# Patient Record
Sex: MALE | Race: BLACK OR AFRICAN AMERICAN | Employment: UNEMPLOYED | URBAN - METROPOLITAN AREA
[De-identification: names, ages, dates, MRNs, and addresses within clinical notes are randomized per-mention and may not be internally consistent; named-entity substitution may affect disease eponyms.]

---

## 2021-07-24 ENCOUNTER — HOSPITAL ENCOUNTER (OUTPATIENT)
Age: 36
Setting detail: OBSERVATION
Discharge: HOME | End: 2021-07-27
Attending: STUDENT IN AN ORGANIZED HEALTH CARE EDUCATION/TRAINING PROGRAM | Admitting: FAMILY MEDICINE
Payer: MEDICAID

## 2021-07-24 ENCOUNTER — ANESTHESIA EVENT (OUTPATIENT)
Dept: SURGERY | Age: 36
End: 2021-07-24
Payer: MEDICAID

## 2021-07-24 ENCOUNTER — APPOINTMENT (OUTPATIENT)
Dept: CT IMAGING | Age: 36
End: 2021-07-24
Attending: STUDENT IN AN ORGANIZED HEALTH CARE EDUCATION/TRAINING PROGRAM
Payer: MEDICAID

## 2021-07-24 ENCOUNTER — APPOINTMENT (OUTPATIENT)
Dept: GENERAL RADIOLOGY | Age: 36
End: 2021-07-24
Attending: EMERGENCY MEDICINE
Payer: MEDICAID

## 2021-07-24 ENCOUNTER — ANESTHESIA (OUTPATIENT)
Dept: SURGERY | Age: 36
End: 2021-07-24
Payer: MEDICAID

## 2021-07-24 ENCOUNTER — HOSPITAL ENCOUNTER (EMERGENCY)
Age: 36
Discharge: OTHER INSTITUTION | End: 2021-07-24
Attending: EMERGENCY MEDICINE
Payer: MEDICAID

## 2021-07-24 ENCOUNTER — APPOINTMENT (OUTPATIENT)
Dept: GENERAL RADIOLOGY | Age: 36
End: 2021-07-24
Attending: SURGERY
Payer: MEDICAID

## 2021-07-24 VITALS
BODY MASS INDEX: 35.99 KG/M2 | TEMPERATURE: 98.1 F | HEIGHT: 69 IN | SYSTOLIC BLOOD PRESSURE: 147 MMHG | HEART RATE: 86 BPM | WEIGHT: 243 LBS | DIASTOLIC BLOOD PRESSURE: 76 MMHG | RESPIRATION RATE: 18 BRPM | OXYGEN SATURATION: 97 %

## 2021-07-24 DIAGNOSIS — S63.004A: Primary | ICD-10-CM

## 2021-07-24 DIAGNOSIS — S62.001A CLOSED VOLAR TRANSSCAPHOID-LUNATE FRACTURE DISLOCATION OF RIGHT WRIST, INITIAL ENCOUNTER: Primary | ICD-10-CM

## 2021-07-24 DIAGNOSIS — T14.8XXA ABRASION: ICD-10-CM

## 2021-07-24 DIAGNOSIS — S62.109D CLOSED FRACTURE OF WRIST WITH ROUTINE HEALING, UNSPECIFIED LATERALITY, SUBSEQUENT ENCOUNTER: ICD-10-CM

## 2021-07-24 DIAGNOSIS — S62.121A CLOSED VOLAR TRANSSCAPHOID-LUNATE FRACTURE DISLOCATION OF RIGHT WRIST, INITIAL ENCOUNTER: Primary | ICD-10-CM

## 2021-07-24 PROBLEM — S62.109A FX WRIST: Status: ACTIVE | Noted: 2021-07-24

## 2021-07-24 PROCEDURE — 74011250636 HC RX REV CODE- 250/636: Performed by: ANESTHESIOLOGY

## 2021-07-24 PROCEDURE — 73100 X-RAY EXAM OF WRIST: CPT

## 2021-07-24 PROCEDURE — 74011250636 HC RX REV CODE- 250/636: Performed by: STUDENT IN AN ORGANIZED HEALTH CARE EDUCATION/TRAINING PROGRAM

## 2021-07-24 PROCEDURE — 77030026438 HC STYL ET INTUB CARD -A: Performed by: ANESTHESIOLOGY

## 2021-07-24 PROCEDURE — 77030013079 HC BLNKT BAIR HGGR 3M -A: Performed by: ANESTHESIOLOGY

## 2021-07-24 PROCEDURE — 76210000006 HC OR PH I REC 0.5 TO 1 HR: Performed by: SURGERY

## 2021-07-24 PROCEDURE — 76060000033 HC ANESTHESIA 1 TO 1.5 HR: Performed by: SURGERY

## 2021-07-24 PROCEDURE — 99285 EMERGENCY DEPT VISIT HI MDM: CPT

## 2021-07-24 PROCEDURE — 74011000258 HC RX REV CODE- 258: Performed by: NURSE ANESTHETIST, CERTIFIED REGISTERED

## 2021-07-24 PROCEDURE — 74011250637 HC RX REV CODE- 250/637: Performed by: ANESTHESIOLOGY

## 2021-07-24 PROCEDURE — 99218 HC RM OBSERVATION: CPT

## 2021-07-24 PROCEDURE — 76010000149 HC OR TIME 1 TO 1.5 HR: Performed by: SURGERY

## 2021-07-24 PROCEDURE — 77030020754 HC CUF TRNQT 2BLA STRY -B: Performed by: SURGERY

## 2021-07-24 PROCEDURE — 73130 X-RAY EXAM OF HAND: CPT

## 2021-07-24 PROCEDURE — 73200 CT UPPER EXTREMITY W/O DYE: CPT

## 2021-07-24 PROCEDURE — 96374 THER/PROPH/DIAG INJ IV PUSH: CPT

## 2021-07-24 PROCEDURE — 77030002933 HC SUT MCRYL J&J -A: Performed by: SURGERY

## 2021-07-24 PROCEDURE — 74011250636 HC RX REV CODE- 250/636: Performed by: NURSE ANESTHETIST, CERTIFIED REGISTERED

## 2021-07-24 PROCEDURE — 74011250637 HC RX REV CODE- 250/637: Performed by: EMERGENCY MEDICINE

## 2021-07-24 PROCEDURE — 99283 EMERGENCY DEPT VISIT LOW MDM: CPT

## 2021-07-24 PROCEDURE — 77030010396 HC WRE FIX C CNMD -A: Performed by: SURGERY

## 2021-07-24 PROCEDURE — 2709999900 HC NON-CHARGEABLE SUPPLY: Performed by: SURGERY

## 2021-07-24 PROCEDURE — 73110 X-RAY EXAM OF WRIST: CPT

## 2021-07-24 PROCEDURE — 74011000250 HC RX REV CODE- 250: Performed by: SURGERY

## 2021-07-24 PROCEDURE — 99222 1ST HOSP IP/OBS MODERATE 55: CPT | Performed by: SURGERY

## 2021-07-24 PROCEDURE — 74011000250 HC RX REV CODE- 250: Performed by: EMERGENCY MEDICINE

## 2021-07-24 PROCEDURE — 74011000250 HC RX REV CODE- 250: Performed by: NURSE ANESTHETIST, CERTIFIED REGISTERED

## 2021-07-24 PROCEDURE — 77030009023 HC CAP PROTCT PIN MCRA -A: Performed by: SURGERY

## 2021-07-24 PROCEDURE — 77030008684 HC TU ET CUF COVD -B: Performed by: ANESTHESIOLOGY

## 2021-07-24 PROCEDURE — G0378 HOSPITAL OBSERVATION PER HR: HCPCS

## 2021-07-24 DEVICE — C-WIRE PAK DOUBLE ENDED ORTHOPAEDIC WIRE, TROCAR, .045" (1.14 MM)
Type: IMPLANTABLE DEVICE | Status: FUNCTIONAL
Brand: C-WIRE

## 2021-07-24 RX ORDER — FENTANYL CITRATE 50 UG/ML
25 INJECTION, SOLUTION INTRAMUSCULAR; INTRAVENOUS
Status: DISCONTINUED | OUTPATIENT
Start: 2021-07-24 | End: 2021-07-25

## 2021-07-24 RX ORDER — SODIUM CHLORIDE 0.9 % (FLUSH) 0.9 %
5-40 SYRINGE (ML) INJECTION EVERY 8 HOURS
Status: DISCONTINUED | OUTPATIENT
Start: 2021-07-24 | End: 2021-07-27 | Stop reason: HOSPADM

## 2021-07-24 RX ORDER — ACETAMINOPHEN 500 MG
1000 TABLET ORAL ONCE
Status: COMPLETED | OUTPATIENT
Start: 2021-07-24 | End: 2021-07-24

## 2021-07-24 RX ORDER — SODIUM CHLORIDE 0.9 % (FLUSH) 0.9 %
5-40 SYRINGE (ML) INJECTION EVERY 8 HOURS
Status: DISCONTINUED | OUTPATIENT
Start: 2021-07-24 | End: 2021-07-24 | Stop reason: HOSPADM

## 2021-07-24 RX ORDER — SODIUM CHLORIDE 9 MG/ML
25 INJECTION, SOLUTION INTRAVENOUS CONTINUOUS
Status: DISCONTINUED | OUTPATIENT
Start: 2021-07-24 | End: 2021-07-24 | Stop reason: HOSPADM

## 2021-07-24 RX ORDER — OXYCODONE HYDROCHLORIDE 5 MG/1
5 TABLET ORAL
Status: COMPLETED | OUTPATIENT
Start: 2021-07-24 | End: 2021-07-24

## 2021-07-24 RX ORDER — SUCCINYLCHOLINE CHLORIDE 20 MG/ML
INJECTION INTRAMUSCULAR; INTRAVENOUS AS NEEDED
Status: DISCONTINUED | OUTPATIENT
Start: 2021-07-24 | End: 2021-07-24 | Stop reason: HOSPADM

## 2021-07-24 RX ORDER — ROCURONIUM BROMIDE 10 MG/ML
INJECTION, SOLUTION INTRAVENOUS AS NEEDED
Status: DISCONTINUED | OUTPATIENT
Start: 2021-07-24 | End: 2021-07-24 | Stop reason: HOSPADM

## 2021-07-24 RX ORDER — SODIUM CHLORIDE, SODIUM LACTATE, POTASSIUM CHLORIDE, CALCIUM CHLORIDE 600; 310; 30; 20 MG/100ML; MG/100ML; MG/100ML; MG/100ML
125 INJECTION, SOLUTION INTRAVENOUS CONTINUOUS
Status: DISCONTINUED | OUTPATIENT
Start: 2021-07-24 | End: 2021-07-24 | Stop reason: HOSPADM

## 2021-07-24 RX ORDER — ROPIVACAINE HYDROCHLORIDE 5 MG/ML
30 INJECTION, SOLUTION EPIDURAL; INFILTRATION; PERINEURAL ONCE
Status: DISCONTINUED | OUTPATIENT
Start: 2021-07-24 | End: 2021-07-24 | Stop reason: HOSPADM

## 2021-07-24 RX ORDER — MIDAZOLAM HYDROCHLORIDE 1 MG/ML
INJECTION, SOLUTION INTRAMUSCULAR; INTRAVENOUS AS NEEDED
Status: DISCONTINUED | OUTPATIENT
Start: 2021-07-24 | End: 2021-07-24 | Stop reason: HOSPADM

## 2021-07-24 RX ORDER — SODIUM CHLORIDE 0.9 % (FLUSH) 0.9 %
5-40 SYRINGE (ML) INJECTION AS NEEDED
Status: DISCONTINUED | OUTPATIENT
Start: 2021-07-24 | End: 2021-07-24 | Stop reason: HOSPADM

## 2021-07-24 RX ORDER — IBUPROFEN 400 MG/1
800 TABLET ORAL
Status: COMPLETED | OUTPATIENT
Start: 2021-07-24 | End: 2021-07-24

## 2021-07-24 RX ORDER — SODIUM CHLORIDE 9 MG/ML
25 INJECTION, SOLUTION INTRAVENOUS CONTINUOUS
Status: DISCONTINUED | OUTPATIENT
Start: 2021-07-24 | End: 2021-07-27 | Stop reason: HOSPADM

## 2021-07-24 RX ORDER — PHENYLEPHRINE HCL IN 0.9% NACL 0.4MG/10ML
SYRINGE (ML) INTRAVENOUS AS NEEDED
Status: DISCONTINUED | OUTPATIENT
Start: 2021-07-24 | End: 2021-07-24 | Stop reason: HOSPADM

## 2021-07-24 RX ORDER — MIDAZOLAM HYDROCHLORIDE 1 MG/ML
1 INJECTION, SOLUTION INTRAMUSCULAR; INTRAVENOUS AS NEEDED
Status: DISCONTINUED | OUTPATIENT
Start: 2021-07-24 | End: 2021-07-24 | Stop reason: HOSPADM

## 2021-07-24 RX ORDER — HYDROMORPHONE HYDROCHLORIDE 1 MG/ML
0.2 INJECTION, SOLUTION INTRAMUSCULAR; INTRAVENOUS; SUBCUTANEOUS
Status: DISCONTINUED | OUTPATIENT
Start: 2021-07-24 | End: 2021-07-25

## 2021-07-24 RX ORDER — PROPOFOL 10 MG/ML
INJECTION, EMULSION INTRAVENOUS AS NEEDED
Status: DISCONTINUED | OUTPATIENT
Start: 2021-07-24 | End: 2021-07-24 | Stop reason: HOSPADM

## 2021-07-24 RX ORDER — DEXAMETHASONE SODIUM PHOSPHATE 4 MG/ML
INJECTION, SOLUTION INTRA-ARTICULAR; INTRALESIONAL; INTRAMUSCULAR; INTRAVENOUS; SOFT TISSUE AS NEEDED
Status: DISCONTINUED | OUTPATIENT
Start: 2021-07-24 | End: 2021-07-24 | Stop reason: HOSPADM

## 2021-07-24 RX ORDER — SODIUM CHLORIDE, SODIUM LACTATE, POTASSIUM CHLORIDE, CALCIUM CHLORIDE 600; 310; 30; 20 MG/100ML; MG/100ML; MG/100ML; MG/100ML
75 INJECTION, SOLUTION INTRAVENOUS CONTINUOUS
Status: DISCONTINUED | OUTPATIENT
Start: 2021-07-24 | End: 2021-07-27 | Stop reason: HOSPADM

## 2021-07-24 RX ORDER — SODIUM CHLORIDE 0.9 % (FLUSH) 0.9 %
5-40 SYRINGE (ML) INJECTION AS NEEDED
Status: DISCONTINUED | OUTPATIENT
Start: 2021-07-24 | End: 2021-07-27 | Stop reason: HOSPADM

## 2021-07-24 RX ORDER — ONDANSETRON 2 MG/ML
INJECTION INTRAMUSCULAR; INTRAVENOUS AS NEEDED
Status: DISCONTINUED | OUTPATIENT
Start: 2021-07-24 | End: 2021-07-24 | Stop reason: HOSPADM

## 2021-07-24 RX ORDER — LIDOCAINE HYDROCHLORIDE 20 MG/ML
INJECTION, SOLUTION EPIDURAL; INFILTRATION; INTRACAUDAL; PERINEURAL AS NEEDED
Status: DISCONTINUED | OUTPATIENT
Start: 2021-07-24 | End: 2021-07-24 | Stop reason: HOSPADM

## 2021-07-24 RX ORDER — LIDOCAINE HYDROCHLORIDE 10 MG/ML
10 INJECTION, SOLUTION EPIDURAL; INFILTRATION; INTRACAUDAL; PERINEURAL ONCE
Status: COMPLETED | OUTPATIENT
Start: 2021-07-24 | End: 2021-07-24

## 2021-07-24 RX ORDER — FENTANYL CITRATE 50 UG/ML
50 INJECTION, SOLUTION INTRAMUSCULAR; INTRAVENOUS AS NEEDED
Status: DISCONTINUED | OUTPATIENT
Start: 2021-07-24 | End: 2021-07-24 | Stop reason: HOSPADM

## 2021-07-24 RX ORDER — LIDOCAINE HYDROCHLORIDE 10 MG/ML
0.1 INJECTION, SOLUTION EPIDURAL; INFILTRATION; INTRACAUDAL; PERINEURAL AS NEEDED
Status: DISCONTINUED | OUTPATIENT
Start: 2021-07-24 | End: 2021-07-24 | Stop reason: HOSPADM

## 2021-07-24 RX ORDER — DIPHENHYDRAMINE HYDROCHLORIDE 50 MG/ML
12.5 INJECTION, SOLUTION INTRAMUSCULAR; INTRAVENOUS AS NEEDED
Status: ACTIVE | OUTPATIENT
Start: 2021-07-24 | End: 2021-07-24

## 2021-07-24 RX ORDER — ACETAMINOPHEN 500 MG
TABLET ORAL
Status: DISPENSED
Start: 2021-07-24 | End: 2021-07-25

## 2021-07-24 RX ORDER — KETAMINE HYDROCHLORIDE 10 MG/ML
INJECTION, SOLUTION INTRAMUSCULAR; INTRAVENOUS AS NEEDED
Status: DISCONTINUED | OUTPATIENT
Start: 2021-07-24 | End: 2021-07-24 | Stop reason: HOSPADM

## 2021-07-24 RX ORDER — MORPHINE SULFATE 2 MG/ML
2 INJECTION, SOLUTION INTRAMUSCULAR; INTRAVENOUS
Status: DISCONTINUED | OUTPATIENT
Start: 2021-07-24 | End: 2021-07-25

## 2021-07-24 RX ORDER — MIDAZOLAM HYDROCHLORIDE 1 MG/ML
0.5 INJECTION, SOLUTION INTRAMUSCULAR; INTRAVENOUS
Status: DISCONTINUED | OUTPATIENT
Start: 2021-07-24 | End: 2021-07-25

## 2021-07-24 RX ORDER — MORPHINE SULFATE 4 MG/ML
4 INJECTION INTRAVENOUS
Status: COMPLETED | OUTPATIENT
Start: 2021-07-24 | End: 2021-07-24

## 2021-07-24 RX ORDER — BUPIVACAINE HYDROCHLORIDE 5 MG/ML
INJECTION, SOLUTION EPIDURAL; INTRACAUDAL AS NEEDED
Status: DISCONTINUED | OUTPATIENT
Start: 2021-07-24 | End: 2021-07-24 | Stop reason: HOSPADM

## 2021-07-24 RX ORDER — HYDROMORPHONE HYDROCHLORIDE 2 MG/ML
INJECTION, SOLUTION INTRAMUSCULAR; INTRAVENOUS; SUBCUTANEOUS AS NEEDED
Status: DISCONTINUED | OUTPATIENT
Start: 2021-07-24 | End: 2021-07-24 | Stop reason: HOSPADM

## 2021-07-24 RX ORDER — DEXMEDETOMIDINE HYDROCHLORIDE 100 UG/ML
INJECTION, SOLUTION INTRAVENOUS AS NEEDED
Status: DISCONTINUED | OUTPATIENT
Start: 2021-07-24 | End: 2021-07-24 | Stop reason: HOSPADM

## 2021-07-24 RX ORDER — FENTANYL CITRATE 50 UG/ML
INJECTION, SOLUTION INTRAMUSCULAR; INTRAVENOUS AS NEEDED
Status: DISCONTINUED | OUTPATIENT
Start: 2021-07-24 | End: 2021-07-24 | Stop reason: HOSPADM

## 2021-07-24 RX ORDER — ONDANSETRON 2 MG/ML
4 INJECTION INTRAMUSCULAR; INTRAVENOUS AS NEEDED
Status: DISCONTINUED | OUTPATIENT
Start: 2021-07-24 | End: 2021-07-27 | Stop reason: HOSPADM

## 2021-07-24 RX ORDER — ACETAMINOPHEN 325 MG/1
650 TABLET ORAL ONCE
Status: DISCONTINUED | OUTPATIENT
Start: 2021-07-24 | End: 2021-07-24

## 2021-07-24 RX ADMIN — HYDROMORPHONE HYDROCHLORIDE 0.5 MG: 2 INJECTION, SOLUTION INTRAMUSCULAR; INTRAVENOUS; SUBCUTANEOUS at 20:56

## 2021-07-24 RX ADMIN — LIDOCAINE HYDROCHLORIDE 100 MG: 20 INJECTION, SOLUTION EPIDURAL; INFILTRATION; INTRACAUDAL; PERINEURAL at 20:30

## 2021-07-24 RX ADMIN — Medication 10 ML: at 19:40

## 2021-07-24 RX ADMIN — MORPHINE SULFATE 4 MG: 4 INJECTION, SOLUTION INTRAMUSCULAR; INTRAVENOUS at 12:07

## 2021-07-24 RX ADMIN — SODIUM CHLORIDE, POTASSIUM CHLORIDE, SODIUM LACTATE AND CALCIUM CHLORIDE 75 ML/HR: 600; 310; 30; 20 INJECTION, SOLUTION INTRAVENOUS at 22:13

## 2021-07-24 RX ADMIN — IBUPROFEN 800 MG: 400 TABLET ORAL at 06:36

## 2021-07-24 RX ADMIN — KETAMINE HYDROCHLORIDE 50 MG: 10 INJECTION, SOLUTION INTRAMUSCULAR; INTRAVENOUS at 20:30

## 2021-07-24 RX ADMIN — DEXMEDETOMIDINE HYDROCHLORIDE 10 MCG: 100 INJECTION, SOLUTION, CONCENTRATE INTRAVENOUS at 20:59

## 2021-07-24 RX ADMIN — DEXMEDETOMIDINE HYDROCHLORIDE 10 MCG: 100 INJECTION, SOLUTION, CONCENTRATE INTRAVENOUS at 21:25

## 2021-07-24 RX ADMIN — OXYCODONE HYDROCHLORIDE 5 MG: 5 TABLET ORAL at 08:29

## 2021-07-24 RX ADMIN — CEFAZOLIN SODIUM 3 G: 10 INJECTION, POWDER, FOR SOLUTION INTRAVENOUS at 20:51

## 2021-07-24 RX ADMIN — ROCURONIUM BROMIDE 10 MG: 10 SOLUTION INTRAVENOUS at 20:30

## 2021-07-24 RX ADMIN — ROCURONIUM BROMIDE 40 MG: 10 SOLUTION INTRAVENOUS at 20:35

## 2021-07-24 RX ADMIN — ONDANSETRON HYDROCHLORIDE 4 MG: 2 INJECTION, SOLUTION INTRAMUSCULAR; INTRAVENOUS at 21:25

## 2021-07-24 RX ADMIN — MIDAZOLAM 2 MG: 1 INJECTION INTRAMUSCULAR; INTRAVENOUS at 20:26

## 2021-07-24 RX ADMIN — Medication 10 ML: at 22:08

## 2021-07-24 RX ADMIN — DEXAMETHASONE SODIUM PHOSPHATE 8 MG: 4 INJECTION, SOLUTION INTRAMUSCULAR; INTRAVENOUS at 20:39

## 2021-07-24 RX ADMIN — DEXMEDETOMIDINE HYDROCHLORIDE 10 MCG: 100 INJECTION, SOLUTION, CONCENTRATE INTRAVENOUS at 21:14

## 2021-07-24 RX ADMIN — PROPOFOL 200 MG: 10 INJECTION, EMULSION INTRAVENOUS at 20:30

## 2021-07-24 RX ADMIN — Medication 120 MCG: at 21:25

## 2021-07-24 RX ADMIN — SODIUM CHLORIDE, POTASSIUM CHLORIDE, SODIUM LACTATE AND CALCIUM CHLORIDE 125 ML/HR: 600; 310; 30; 20 INJECTION, SOLUTION INTRAVENOUS at 19:40

## 2021-07-24 RX ADMIN — SUCCINYLCHOLINE CHLORIDE 160 MG: 20 INJECTION, SOLUTION INTRAMUSCULAR; INTRAVENOUS at 20:30

## 2021-07-24 RX ADMIN — FENTANYL CITRATE 100 MCG: 50 INJECTION, SOLUTION INTRAMUSCULAR; INTRAVENOUS at 20:30

## 2021-07-24 RX ADMIN — LIDOCAINE HYDROCHLORIDE 10 ML: 10 INJECTION, SOLUTION EPIDURAL; INFILTRATION; INTRACAUDAL; PERINEURAL at 09:03

## 2021-07-24 RX ADMIN — SUGAMMADEX 400 MG: 100 INJECTION, SOLUTION INTRAVENOUS at 21:29

## 2021-07-24 RX ADMIN — ACETAMINOPHEN 1000 MG: 500 TABLET ORAL at 19:44
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
## 2021-07-24 NOTE — ED PROVIDER NOTES
76-year-old gentleman transferred for surgical stabilization of a right carpal bone dislocation. He had a fall last evening onto an outstretched right wrist.  Only complaint is right wrist pain, no loss of consciousness, headache. Complaining of pain, loss of motion of the right wrist and tenderness. Pain is aching and moderate, constant. The history is provided by the patient. Hand Pain   This is a new problem. The current episode started 3 to 5 hours ago. The problem occurs constantly. The pain is present in the right wrist. The pain is moderate. Associated symptoms include limited range of motion. History reviewed. No pertinent past medical history. History reviewed. No pertinent surgical history. History reviewed. No pertinent family history. Social History     Socioeconomic History    Marital status: SINGLE     Spouse name: Not on file    Number of children: Not on file    Years of education: Not on file    Highest education level: Not on file   Occupational History    Not on file   Tobacco Use    Smoking status: Current Every Day Smoker   Substance and Sexual Activity    Alcohol use: Not on file    Drug use: Yes     Types: Cocaine    Sexual activity: Not on file   Other Topics Concern    Not on file   Social History Narrative    Not on file     Social Determinants of Health     Financial Resource Strain:     Difficulty of Paying Living Expenses:    Food Insecurity:     Worried About Running Out of Food in the Last Year:     [address] in the Last Year:    Transportation Needs:     Lack of Transportation (Medical):      Lack of Transportation (Non-Medical):    Physical Activity:     Days of Exercise per Week:     Minutes of Exercise per Session:    Stress:     Feeling of Stress :    Social Connections:     Frequency of Communication with Friends and Family:     Frequency of Social Gatherings with Friends and Family:     Attends Sabianist Services:     Active Member of Clubs or Organizations:     Attends Club or Organization Meetings:     Marital Status:    Intimate Partner Violence:     Fear of Current or Ex-Partner:     Emotionally Abused:     Physically Abused:     Sexually Abused: ALLERGIES: Patient has no known allergies. Review of Systems   Constitutional: Negative. Respiratory: Negative. Cardiovascular: Negative. Gastrointestinal: Negative. Musculoskeletal: Positive for arthralgias. All other systems reviewed and are negative. Vitals:    07/26/21 1403 07/26/21 2038 07/27/21 0138 07/27/21 0800   BP: (!) 154/91 (!) 159/62 133/72    Pulse: 81 (!) 59 64 65   Resp: 16 14 18    Temp: 98.5 °F (36.9 °C) 98.5 °F (36.9 °C) 98.4 °F (36.9 °C)    SpO2: 98% 98% 97%    Weight:       Height:                Physical Exam  Constitutional:       General: He is not in acute distress. Appearance: He is not toxic-appearing. HENT:      Head: Normocephalic and atraumatic. Mouth/Throat:      Mouth: Mucous membranes are moist.   Eyes:      Extraocular Movements: Extraocular movements intact. Cardiovascular:      Rate and Rhythm: Normal rate and regular rhythm. Heart sounds: Normal heart sounds. Pulmonary:      Effort: Pulmonary effort is normal. No respiratory distress. Breath sounds: Normal breath sounds. Abdominal:      Palpations: Abdomen is soft. Tenderness: There is no abdominal tenderness. Musculoskeletal:        Arms:       Cervical back: Normal range of motion. Right lower leg: No edema. Left lower leg: No edema. Skin:     Capillary Refill: Capillary refill takes less than 2 seconds. Neurological:      General: No focal deficit present. Mental Status: He is alert and oriented to person, place, and time.    Psychiatric:         Mood and Affect: Mood normal.          MDM          Procedures      PROGRESS NOTE:  1:26 PM    Patient transferred for operative management of carpal bone dislocation, discussed with attending surgeon who requested a CT performed prior to the operation, he is booked for the OR and will likely be discharged from the operating room to follow-up with Dr. Claire Springer:  Labs Reviewed   METABOLIC PANEL, COMPREHENSIVE - Abnormal; Notable for the following components:       Result Value    Anion gap 4 (*)     Creatinine 1.62 (*)     BUN/Creatinine ratio 11 (*)     GFR est AA 59 (*)     GFR est non-AA 48 (*)     Calcium 8.4 (*)     AST (SGOT) 41 (*)     Protein, total 6.3 (*)     Albumin 3.0 (*)     A-G Ratio 0.9 (*)     All other components within normal limits   URIC ACID - Abnormal; Notable for the following components:    Uric acid 9.2 (*)     All other components within normal limits   METABOLIC PANEL, BASIC - Abnormal; Notable for the following components:    Glucose 108 (*)     Creatinine 1.33 (*)     BUN/Creatinine ratio 11 (*)     Calcium 8.4 (*)     All other components within normal limits   CBC WITH AUTOMATED DIFF   PROTHROMBIN TIME + INR   URINALYSIS W/ RFLX MICROSCOPIC   SODIUM, UR, RANDOM   CREATININE, UR, RANDOM   CBC WITH AUTOMATED DIFF       IMAGING RESULTS:  US RETROPERITONEUM COMP   Final Result   Normal exam.                     XR WRIST RT AP/LAT   Final Result      CT UP EXT RT WO CONT   Final Result   Extensive ligamentous disruption of the volar and dorsal carpal   ligaments suspected given the distribution of avulsion fractures as indicated   above. The findings are associated with a fracture of the lunate with dominant   horizontal component and volar dislocation of the volar component.                      NC XR TECHNOLOGIST SERVICE    (Results Pending)       MEDICATIONS GIVEN:  Medications   diphenhydrAMINE (BENADRYL) injection 12.5 mg (has no administration in time range)   acetaminophen (TYLENOL) 500 mg tablet (  Canceled Entry 7/24/21 2000)   morphine injection 4 mg (4 mg IntraVENous Given 7/24/21 1207)   acetaminophen (TYLENOL) tablet 1,000 mg (1,000 mg Oral Given 7/24/21 1944)       IMPRESSION:  1. Dislocation of carpal joint, right, initial encounter    2. Closed fracture of wrist with routine healing, unspecified laterality, subsequent encounter        PLAN:  Follow-up Information     Follow up With Specialties Details Why Contact Info    your Primary Care Physician  Schedule an appointment as soon as possible for a visit       [address] [facility] [address] DEP Emergency Medicine  As needed [address]    Hemant Funk MD Hand Surgery, General Surgery   [address] [facility] [address] [facility]  In 2 days  [address]  [facility] [address] [facility]    Hemant Funk MD Hand Surgery, General Surgery In 1 week  [address] [facility]  [address] [place] [address]  [phone]      Panchito Lyman MD Family Medicine On 8/16/2021 For new patient appointment at 10:40AM  [address]  [address] [place] [address]  [phone]      None    None (395) Patient stated that they have no PCP      Sita Campos MD Nephrology In 2 days  [facility]  [phone]           Discharge Medication List as of 7/27/2021  1:01 PM      START taking these medications    Details   traMADoL (ULTRAM) 50 mg tablet Take 1 Tablet by mouth every six (6) hours as needed for Pain for up to 3 days.  Max Daily Amount: 200 mg., Print, Disp-12 Tablet, R-0               Karen Moralez MD
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
## 2021-07-24 NOTE — ED NOTES
Bedside shift change report given to Bunny Roblero (oncoming nurse) by Mina Schulz (offgoing nurse). Report included the following information SBAR, Kardex and MAR.

## 2021-07-24 NOTE — ANESTHESIA PREPROCEDURE EVALUATION
Relevant Problems   No relevant active problems       Anesthetic History   No history of anesthetic complications            Review of Systems / Medical History  Patient summary reviewed, nursing notes reviewed and pertinent labs reviewed    Pulmonary  Within defined limits        Smoker         Neuro/Psych   Within defined limits           Cardiovascular  Within defined limits                     GI/Hepatic/Renal  Within defined limits              Endo/Other        Obesity     Other Findings   Comments: Cocaine user           Physical Exam    Airway  Mallampati: II           Cardiovascular               Dental         Pulmonary                 Abdominal         Other Findings            Anesthetic Plan    ASA: 3  Anesthesia type: general          Induction: Intravenous  Anesthetic plan and risks discussed with: Patient

## 2021-07-24 NOTE — ED NOTES
TRANSFER - OUT REPORT:    Verbal report given to MATTI DALE [facility]) on Craig Jhaveri  being transferred to [facility] SPECIALTY \[facility]\"" - [place]. Myrna's(unit) for routine progression of care       Report consisted of patients Situation, Background, Assessment and   Recommendations(SBAR). Information from the following report(s) SBAR, Kardex, ED Summary, [facility] and Recent Results was reviewed with the receiving nurse. Lines:  18 gauge LAC    Opportunity for questions and clarification was provided.       Patient transported with:

## 2021-07-24 NOTE — ED NOTES
TRANSFER - OUT REPORT:    Verbal report given to GOPAL Mcelroy(name) on Manjinder Escamilla  being transferred to PACU/OR(unit) for routine progression of care       Report consisted of patients Situation, Background, Assessment and   Recommendations(SBAR). Information from the following report(s) SBAR, ED Summary, [facility] and Recent Results was reviewed with the receiving nurse. Lines:   Peripheral IV 07/24/21 Left Antecubital (Active)   Site Assessment Clean, dry, & intact 07/24/21 1103   Phlebitis Assessment 0 07/24/21 1103   Infiltration Assessment 0 07/24/21 1103   Dressing Status Clean, dry, & intact 07/24/21 1103   Dressing Type Transparent 07/24/21 1103   Hub Color/Line Status Green 07/24/21 1103        Opportunity for questions and clarification was provided.       Patient transported with:  Pacu team.

## 2021-07-25 PROCEDURE — 74011250637 HC RX REV CODE- 250/637: Performed by: SURGERY

## 2021-07-25 PROCEDURE — 99024 POSTOP FOLLOW-UP VISIT: CPT | Performed by: SURGERY

## 2021-07-25 PROCEDURE — 74011250637 HC RX REV CODE- 250/637: Performed by: STUDENT IN AN ORGANIZED HEALTH CARE EDUCATION/TRAINING PROGRAM

## 2021-07-25 PROCEDURE — G0378 HOSPITAL OBSERVATION PER HR: HCPCS

## 2021-07-25 PROCEDURE — 99218 HC RM OBSERVATION: CPT

## 2021-07-25 RX ORDER — SODIUM CHLORIDE 0.9 % (FLUSH) 0.9 %
5-40 SYRINGE (ML) INJECTION EVERY 8 HOURS
Status: DISCONTINUED | OUTPATIENT
Start: 2021-07-25 | End: 2021-07-27 | Stop reason: HOSPADM

## 2021-07-25 RX ORDER — SODIUM CHLORIDE 0.9 % (FLUSH) 0.9 %
5-40 SYRINGE (ML) INJECTION AS NEEDED
Status: DISCONTINUED | OUTPATIENT
Start: 2021-07-25 | End: 2021-07-27 | Stop reason: HOSPADM

## 2021-07-25 RX ORDER — ONDANSETRON 2 MG/ML
4 INJECTION INTRAMUSCULAR; INTRAVENOUS
Status: DISCONTINUED | OUTPATIENT
Start: 2021-07-25 | End: 2021-07-27 | Stop reason: HOSPADM

## 2021-07-25 RX ORDER — ACETAMINOPHEN 650 MG/1
650 SUPPOSITORY RECTAL
Status: DISCONTINUED | OUTPATIENT
Start: 2021-07-25 | End: 2021-07-27 | Stop reason: HOSPADM

## 2021-07-25 RX ORDER — POLYETHYLENE GLYCOL 3350 17 G/17G
17 POWDER, FOR SOLUTION ORAL DAILY PRN
Status: DISCONTINUED | OUTPATIENT
Start: 2021-07-25 | End: 2021-07-27 | Stop reason: HOSPADM

## 2021-07-25 RX ORDER — ACETAMINOPHEN 325 MG/1
650 TABLET ORAL
Status: DISCONTINUED | OUTPATIENT
Start: 2021-07-25 | End: 2021-07-27 | Stop reason: HOSPADM

## 2021-07-25 RX ORDER — ONDANSETRON 4 MG/1
4 TABLET, ORALLY DISINTEGRATING ORAL
Status: DISCONTINUED | OUTPATIENT
Start: 2021-07-25 | End: 2021-07-27 | Stop reason: HOSPADM

## 2021-07-25 RX ORDER — OXYCODONE AND ACETAMINOPHEN 5; 325 MG/1; MG/1
1 TABLET ORAL
Status: DISCONTINUED | OUTPATIENT
Start: 2021-07-25 | End: 2021-07-27 | Stop reason: HOSPADM

## 2021-07-25 RX ORDER — TRAMADOL HYDROCHLORIDE 50 MG/1
50 TABLET ORAL
Status: DISCONTINUED | OUTPATIENT
Start: 2021-07-25 | End: 2021-07-26

## 2021-07-25 RX ADMIN — TRAMADOL HYDROCHLORIDE 50 MG: 50 TABLET, COATED ORAL at 14:07

## 2021-07-25 RX ADMIN — Medication 10 ML: at 14:00

## 2021-07-25 RX ADMIN — OXYCODONE HYDROCHLORIDE AND ACETAMINOPHEN 1 TABLET: 5; 325 TABLET ORAL at 23:59

## 2021-07-25 RX ADMIN — OXYCODONE HYDROCHLORIDE AND ACETAMINOPHEN 1 TABLET: 5; 325 TABLET ORAL at 18:49

## 2021-07-25 RX ADMIN — Medication 10 ML: at 06:00

## 2021-07-25 RX ADMIN — Medication 10 ML: at 14:08

## 2021-07-25 NOTE — PROGRESS NOTES
Transition of Care Plan  RUR N/A    Observation notice provided in writing to patient and/or caregiver as well as verbal explanation of the policy. Patients who are in outpatient status also receive the Observation notice. Patient has received notice and or patient representative has received via secure email, fax, or certified mail based on patient representative's preference. State letter signed and placed in chart    Surgery 7/24/21  Closed reduction percutaneous pinning right lunate    Disposition  Home  Transportation  Family (cousin)  Medical follow up   Specialist    Contact  Father  Henry Chin  [phone]    CM met with patient in his room-- he confirmed demographics and informed CM that he uses his father's address but stays with a friend. 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 He does not have a car but gets rides where he needs to go. [facility] and no concerns in securing [facility]    Patient said he will have a ride home when discharged. No identified transition of care needs. CM will remain available to assist with any needs that arise. Care Management Interventions  PCP Verified by CM:  (no PCP)  Mode of Transport at Discharge:  (car)  Transition of Care Consult (CM Consult): Discharge Planning  Discharge Durable Medical Equipment: No  Physical Therapy Consult: No  Occupational Therapy Consult: No  Speech Therapy Consult: No  Current Support Network:  Other (stays with a friend or father)  Confirm Follow Up Transport: Friends (family)  Discharge Location  Discharge Placement: Home

## 2021-07-25 NOTE — PERIOP NOTES
TRANSFER - OUT REPORT:    Verbal report given to Tamara HERRON(name) on Moo Varela  being transferred to 56(unit) for routine post - op       Report consisted of patients Situation, Background, Assessment and   Recommendations(SBAR). Time Pre op antibiotic given:ancef 3gm IV @ 2051  Anesthesia Stop time: 2139  Cross Present on Transfer to floor:no, last voided pre-op at 1900  Order for Cross on Chart:no  Discharge Prescriptions with Chart:no    Information from the following report(s) SBAR, OR Summary, Intake/Output and MAR was reviewed with the receiving nurse. Opportunity for questions and clarification was provided. Is the patient on 02? NO       L/Min n/a       Other n/a    Is the patient on a monitor? NO    Is the nurse transporting with the patient? YES    Surgical Waiting Area notified of patient's transfer from PACU?  YES      The following personal items collected during your admission accompanied patient upon transfer:   Dental Appliance: Dental Appliances: None  Vision:    Hearing Aid:    Jewelry:    Clothing: Clothing:  (two clothing bags placed on pt's stretcher in pacu)  Other Valuables:    Valuables sent to safe: Personal Items Sent to Safe:  (one razor knife sent to Security preoperatively)    Belonging bags x 2 with clothing and boots sent with pt to  567

## 2021-07-25 NOTE — ANESTHESIA POSTPROCEDURE EVALUATION
Post-Anesthesia Evaluation and Assessment    Patient: Odessa Martinez MRN: [id number]  SSN: [id number]    YOB: 1985  Age: 38 y.o. Sex: male      I have evaluated the patient and they are stable and ready for discharge from the PACU. Cardiovascular Function/Vital Signs  Visit Vitals  /63   Pulse 69   Temp 36.2 °C (97.1 °F)   Resp 14   SpO2 97%       Patient is status post General anesthesia for Procedure(s):  CLOSED REDUCTION PERCUTANEOUS PINNING RIGHT LUNATE. Nausea/Vomiting: None    Postoperative hydration reviewed and adequate. Pain:  Pain Scale 1: Numeric (0 - 10) (07/24/21 1836)  Pain Intensity 1: 6 (07/24/21 1836)   Managed    Neurological Status: At baseline    Mental Status, Level of Consciousness: Alert and  oriented to person, place, and time    Pulmonary Status:   O2 Device: None (07/24/21 2138)   Adequate oxygenation and airway patent    Complications related to anesthesia: None    Post-anesthesia assessment completed. No concerns    Signed By: Radu Hall MD     July 24, 2021              Procedure(s):  CLOSED REDUCTION PERCUTANEOUS PINNING RIGHT LUNATE. general    <BSHSIANPOST>    INITIAL Post-op Vital signs:   Vitals Value Taken Time   /63 07/24/21 2145   Temp 36.2 °C (97.1 °F) 07/24/21 2138   Pulse 70 07/24/21 2148   Resp 14 07/24/21 2148   SpO2 97 % 07/24/21 2148   Vitals shown include unvalidated device data.

## 2021-07-25 NOTE — H&P
History & Physical    Primary Care Provider: None  Source of Information: Patient and chart review    History of Presenting Illness:   Xochilt Poole is a 38 y.o. male with history of obesity surgical stabilization of right breast after [address] injury. Patient is postop day 0 status post closed reduction percutaneous pinning of right lunate. He seen and examined at bedside. Has no acute complaints or concerns. Endorses some numbness in his right hand following surgery but denies any pain. Patient complaint discharge follow neurosurgery complaint uncertain disposition and was deferred admitted for 5 recurrent pain. Review of Systems:  A comprehensive review of systems was negative except for that written in the History of Present Illness. History reviewed. No pertinent past medical history. History reviewed. No pertinent surgical history. Prior to Admission medications    Not on File     No Known Allergies   History reviewed. No pertinent family history. SOCIAL HISTORY:  Patient resides:  Independently x   Assisted Living    SNF    With family care       Smoking history:   None x   Former    Chronic      Alcohol history:   None x   Social    Chronic      Ambulates:   Independently x   w/cane    w/walker    w/wc    CODE STATUS:  DNR    Full x   Other      Objective:     Physical Exam:     Visit Vitals  /60 (BP 1 Location: Left upper arm, BP Patient Position: At rest)   Pulse 68   Temp 97.9 °F (36.6 °C)   Resp 16   Ht 5' 9\" (1.753 m)   Wt 100.7 kg (222 lb)   SpO2 97%   BMI 32.78 kg/m²      O2 Device: None (Room air)    General:  Alert, cooperative, no distress, appears stated age. Head:  Normocephalic, without obvious abnormality, atraumatic. Eyes:  Conjunctivae/corneas clear. PERRL, EOMs intact. Nose: Nares normal. Septum midline. Mucosa normal.        Neck: Supple, symmetrical, trachea midline, no carotid bruit and no JVD.        Lungs:   Clear to auscultation bilaterally. Chest wall:  No tenderness or deformity. Heart:  Regular rate and rhythm, S1, S2 normal, no murmur, click, rub or gallop. Abdomen:   Soft, non-tender. Bowel sounds normal. No masses,  No organomegaly. Extremities: Extremities normal, atraumatic, no cyanosis or edema. Right-handed surgical bandage. Pulses: 2+ and symmetric all extremities. Skin: Skin color, texture, turgor normal. No rashes or lesions   Neurologic: CNII-XII intact. Data Review:     Recent Days:  No results for input(s): WBC, HGB, HCT, PLT, HGBEXT, HCTEXT, PLTEXT in the last 72 hours. No results for input(s): NA, K, CL, CO2, GLU, BUN, CREA, CA, MG, PHOS, ALB, TBIL, ALT, INR, INREXT in the last 72 hours. No lab exists for component: SGOT  No results for input(s): PH, PCO2, PO2, HCO3, FIO2 in the last 72 hours. 24 Hour Results:  No results found for this or any previous visit (from the past 24 hour(s)). Imaging:     Assessment:     Nas Jones is a 38 y.o. male with history of obesity surgical stabilization of right breast after [address] injury. Patient is postop day 0 status post closed reduction percutaneous pinning of right lunate. Plan:       Right wrist fracture  -Postop day 0 status post closed reduction percutaneous pinning of right lunate  -Tylenol and Tramadol prn  -CM consult for dispo planning  -Ortho/Hand surgery to follow  -Anticipate AM discharge    Mood disorder   -stable. Not in any mood stabilizers.     Obesity  -Weight loss counselling                FEN/GI -  PO hydration  Activity - as tolerated  DVT prophylaxis - scds  GI prophylaxis -  ni  Disposition - home  CODE STATUS:  Full code       Signed By: Varsha Crabtree MD     July 25, 2021

## 2021-07-25 NOTE — BRIEF OP NOTE
Brief Postoperative Note    Patient: Meena Jacques  YOB: 1985  MRN: [id number]    Date of Procedure: 7/24/2021     Pre-Op Diagnosis: right lunate fracture dislocation    Post-Op Diagnosis: Same as preoperative diagnosis.       Procedure(s):  CLOSED REDUCTION PERCUTANEOUS PINNING RIGHT LUNATE    Surgeon(s):  Jayden Lowe MD    Surgical Assistant: Surg Asst-1: Bety Karen    Anesthesia: General     Estimated Blood Loss (mL): Minimal    Complications: None    Specimens: * No specimens in log *     Implants: * No implants in log *    Drains: * No LDAs found *    Findings:  right lunate fracture dislocation    Electronically Signed by Atul Dietz MD on 7/24/2021 at 9:28 PM

## 2021-07-25 NOTE — PROGRESS NOTES
[facility]                                                                                          Hospitalist Progress Note  Leonard Cox MD  Answering service: [phone] OR [phone] from in house phone        Date of Service:  2021  NAME:  Esthela Chavez  :  1985  MRN:  [id number]      Admission Summary:   Esthela Chavez is a 38 y.o. male with history of obesity surgical stabilization of right breast after [address] injury. Patient is postop day 0 status post closed reduction percutaneous pinning of right lunate. He seen and examined at bedside. Has no acute complaints or concerns. Endorses some numbness in his right hand following surgery but denies any pain. Patient complaint discharge follow neurosurgery complaint uncertain disposition and was deferred admitted for 5 recurrent pain. Interval history / Subjective:   Patient resting in bed, no complaints, continues to have some pain and discomfort in his hand     Assessment & Plan:     Right wrist fracture  -Postop day 1 status post closed reduction percutaneous pinning of right lunate  -Add IV morphine as needed for better pain control  -Tylenol and Tramadol prn  -CM consult for dispo planning  -Ortho/Hand surgery to follow  -Await Ortho surgery input    Accelerated hypertension  - Likely secondary to above  -Hydralazine as needed  -Optimize pain control      Mood disorder   -stable. Not in any mood stabilizers.     Obesity  -Weight loss counselling        Code status: Full code  DVT prophylaxis: Heparin    Care Plan discussed with: Patient/Family  Anticipated Disposition: Home w/Family  Anticipated Discharge: Less than 24 hours     Hospital Problems  Never Reviewed        Codes Class Noted POA    Fx wrist ICD-10-CM: O31.127C  ICD-9-CM: 814.00  2021 Unknown                Review of Systems:   A comprehensive review of systems was negative except for that written in the HPI.        Vital Signs:    Last 24hrs VS reviewed since prior progress note. Most recent are:  Visit Vitals  BP (!) 160/89 (BP 1 Location: Left upper arm, BP Patient Position: At rest)   Pulse 63   Temp 97.6 °F (36.4 °C)   Resp 16   Ht 5' 9\" (1.753 m)   Wt 100.7 kg (222 lb)   SpO2 96%   BMI 32.78 kg/m²         Intake/Output Summary (Last 24 hours) at 7/25/2021 1130  Last data filed at 7/25/2021 0010  Gross per 24 hour   Intake 300 ml   Output 610 ml   Net -310 ml        Physical Examination:     I had a face to face encounter with this patient and independently examined them on 7/25/2021 as outlined below:          General : alert x 3, awake, no acute distress, resting in bed, pleasant male, appears to be stated age  [de-identified]: PEERL, EOMI, moist mucus membrane, TM clear  Neck: supple, no JVD, no meningeal signs  Chest: Clear to auscultation bilaterally   CVS: S1 S2 heard, Capillary refill less than 2 seconds  Abd: soft/ Non tender, non distended, BS physiological,   Ext: no clubbing, no cyanosis, no edema, right arm in cast  Neuro/Psych: pleasant mood and affect, CN 2-12 grossly intact, sensory grossly within normal limit, Strength 5/5 in all extremities, DTR 1+ x 4  Skin: warm     Data Review:    Review and/or order of clinical lab test      Labs:   No results for input(s): WBC, HGB, HCT, PLT, HGBEXT, HCTEXT, PLTEXT in the last 72 hours. No results for input(s): NA, K, CL, CO2, BUN, CREA, GLU, CA, MG, PHOS, URICA in the last 72 hours. No results for input(s): ALT, AP, TBIL, TBILI, TP, ALB, GLOB, GGT, AML, LPSE in the last 72 hours. No lab exists for component: SGOT, GPT, AMYP, HLPSE  No results for input(s): INR, PTP, APTT, INREXT in the last 72 hours. No results for input(s): FE, TIBC, PSAT, FERR in the last 72 hours. No results found for: FOL, RBCF   No results for input(s): PH, PCO2, PO2 in the last 72 hours. No results for input(s): CPK, CKNDX, TROIQ in the last 72 hours.     No lab exists for component: CPKMB  No results found for: CHOL, CHOLX, CHLST, CHOLV, HDL, HDLP, LDL, LDLC, DLDLP, TGLX, TRIGL, TRIGP, CHHD, CHHDX  No results found for: GLUCPOC  No results found for: COLOR, APPRN, SPGRU, REFSG, LILY, PROTU, GLUCU, KETU, BILU, UROU, CECILLE, LEUKU, GLUKE, EPSU, BACTU, WBCU, RBCU, CASTS, UCRY      Medications Reviewed:     Current Facility-Administered Medications   Medication Dose Route Frequency    sodium chloride (NS) flush 5-40 mL  5-40 mL IntraVENous Q8H    sodium chloride (NS) flush 5-40 mL  5-40 mL IntraVENous PRN    acetaminophen (TYLENOL) tablet 650 mg  650 mg Oral Q6H PRN    Or    acetaminophen (TYLENOL) suppository 650 mg  650 mg Rectal Q6H PRN    polyethylene glycol (MIRALAX) packet 17 g  17 g Oral DAILY PRN    ondansetron (ZOFRAN ODT) tablet 4 mg  4 mg Oral Q8H PRN    Or    ondansetron (ZOFRAN) injection 4 mg  4 mg IntraVENous Q6H PRN    traMADoL (ULTRAM) tablet 50 mg  50 mg Oral Q6H PRN    lactated Ringers infusion  75 mL/hr IntraVENous CONTINUOUS    0.9% sodium chloride infusion  25 mL/hr IntraVENous CONTINUOUS    sodium chloride (NS) flush 5-40 mL  5-40 mL IntraVENous Q8H    sodium chloride (NS) flush 5-40 mL  5-40 mL IntraVENous PRN    ondansetron (ZOFRAN) injection 4 mg  4 mg IntraVENous PRN     ______________________________________________________________________  EXPECTED LENGTH OF STAY: - - -  ACTUAL LENGTH OF STAY:          0      Please note that this dictation was completed with [employer], the computer voice recognition software. Quite often unanticipated grammatical, syntax, homophones, and other interpretive errors are inadvertently transcribed by the computer software. Please disregard these errors. Please excuse any errors that have escaped final proofreading.                 Tammie Layton MD

## 2021-07-25 NOTE — PROGRESS NOTES
Primary Nurse Kalia Dawn RN and Barbara Shelby RN performed a dual skin assessment on this patient No impairment noted  Haile score is 23. RT WRIST INJURY S/P SURGERY. DRESSING DRY AND INTACT.

## 2021-07-25 NOTE — OP NOTES
[address]   OPERATIVE REPORT    Name:  Manuel Stoddard  MR#:  [id number]  :  1985  ACCOUNT #:  [de-identified]  DATE OF SERVICE:  2021    PREOPERATIVE DIAGNOSIS:  Right lunate fracture dislocation. POSTOPERATIVE DIAGNOSIS:  Right lunate fracture dislocation. PROCEDURE PERFORMED:  Right lunate closed reduction and percutaneous pinning. SURGEON:  Catherine Castillo MD    ASSISTANT:  Per OR Record. ANESTHESIA:  Per OR Record. .    COMPLICATIONS:  None. SPECIMENS REMOVED:  None. IMPLANTS:  Per OR Record. .    ESTIMATED BLOOD LOSS:  Minimal.    DRAINS:  None. DISPOSITION:  PACU. CONDITION:  Stable. INDICATIONS FOR PROCEDURE:  The patient had trauma to his right wrist causing a fracture of the lunate with volar and dorsal displacement requiring reduction and fixation. PROCEDURE:  The patient was taken to the holding area. Risks, benefits, and alternatives were explained to the patient. They include but are not limited to risk of bleeding, risk of infection, risk of injury to tendon, nerve, and artery, risk of failure of procedure or other symptoms, risk of requiring multiple procedures, risk of anesthesia among others. Once all risks, benefits, and alternatives were explained to the patient, informed consent was obtained and the patient placed supine on the operating room table and general anesthesia was induced. The right upper extremity was prepped and draped in the usual sterile fashion and time-out was held to confirm the above information. Once all the equipment was ready and the patient pressped and draped in a strile fashion a time out was held. The Esmarch bandage ws wrapped around the right hand  and the tourniquet was insufflated. The right wrist then underwent a reduction maneuver to allow for closed reduction of the lunate, which was appreciated in the PA and the lateral intraoperative x-rays.   Two K-wires were passed from the scaphoid into both the dorsal and the volar aspect of the lunate. This was repeated on the triquetrum side where the additional 0.045 K-wires were passed through the triquetrum to the volar segment of the lunate and the dorsal segment of the lunate fracture fragment to allow for appropriate reduction along with good alignment. The carpal height was restored and reduction was noted. The 0.045 K-wires were cut to length. The areas around the K-wires were dressed with Xeroform. Final intraoperative x-rays showed the lunate fracture now reduced with good circulation noted. Pin caps were then placed and the wound was dressed with sterile dressing with a volar resting splint and an Ace wrap. Needle count, sponge count, and instrument count were correct at the conclusion of the case. The lunate fracture is now reduced with percutaneous pinning. ATTENDING ATTESTATION:  I performed the procedure.         MD FESTUS [id number]  D:  07/24/2021 23:59  T:  07/25/2021 4:47  JOB #:  [id number]

## 2021-07-25 NOTE — PROGRESS NOTES
TRANSFER - IN REPORT:    Verbal report received from Kiera [address] RN(name) on [facility]  being received from PACU(unit) for routine post - op      Report consisted of patients Situation, Background, Assessment and   Recommendations(SBAR). Information from the following report(s) SBAR, Kardex, OR Summary, Procedure Summary, Intake/Output, MAR and Recent Results was reviewed with the receiving nurse. Opportunity for questions and clarification was provided. Assessment completed upon patients arrival to unit and care assumed.

## 2021-07-26 PROBLEM — N17.9 AKI (ACUTE KIDNEY INJURY) (HCC): Status: ACTIVE | Noted: 2021-07-26

## 2021-07-26 LAB
ALBUMIN SERPL-MCNC: 3 G/DL (ref 3.5–5)
ALBUMIN/GLOB SERPL: 0.9 {RATIO} (ref 1.1–2.2)
ALP SERPL-CCNC: 101 U/L (ref 45–117)
ALT SERPL-CCNC: 30 U/L (ref 12–78)
ANION GAP SERPL CALC-SCNC: 4 MMOL/L (ref 5–15)
APPEARANCE UR: CLEAR
AST SERPL-CCNC: 41 U/L (ref 15–37)
BASOPHILS # BLD: 0 K/UL (ref 0–0.1)
BASOPHILS NFR BLD: 0 % (ref 0–1)
BILIRUB SERPL-MCNC: 0.5 MG/DL (ref 0.2–1)
BILIRUB UR QL: NEGATIVE
BUN SERPL-MCNC: 18 MG/DL (ref 6–20)
BUN/CREAT SERPL: 11 (ref 12–20)
CALCIUM SERPL-MCNC: 8.4 MG/DL (ref 8.5–10.1)
CHLORIDE SERPL-SCNC: 108 MMOL/L (ref 97–108)
CO2 SERPL-SCNC: 27 MMOL/L (ref 21–32)
COLOR UR: NORMAL
CREAT SERPL-MCNC: 1.62 MG/DL (ref 0.7–1.3)
DIFFERENTIAL METHOD BLD: NORMAL
EOSINOPHIL # BLD: 0.1 K/UL (ref 0–0.4)
EOSINOPHIL NFR BLD: 1 % (ref 0–7)
ERYTHROCYTE [DISTWIDTH] IN BLOOD BY AUTOMATED COUNT: 14.2 % (ref 11.5–14.5)
GLOBULIN SER CALC-MCNC: 3.3 G/DL (ref 2–4)
GLUCOSE SERPL-MCNC: 90 MG/DL (ref 65–100)
GLUCOSE UR STRIP.AUTO-MCNC: NEGATIVE MG/DL
HCT VFR BLD AUTO: 43.2 % (ref 36.6–50.3)
HGB BLD-MCNC: 14.7 G/DL (ref 12.1–17)
HGB UR QL STRIP: NEGATIVE
IMM GRANULOCYTES # BLD AUTO: 0 K/UL (ref 0–0.04)
IMM GRANULOCYTES NFR BLD AUTO: 0 % (ref 0–0.5)
INR PPP: 0.9 (ref 0.9–1.1)
KETONES UR QL STRIP.AUTO: NEGATIVE MG/DL
LEUKOCYTE ESTERASE UR QL STRIP.AUTO: NEGATIVE
LYMPHOCYTES # BLD: 2.8 K/UL (ref 0.8–3.5)
LYMPHOCYTES NFR BLD: 30 % (ref 12–49)
MCH RBC QN AUTO: 30.4 PG (ref 26–34)
MCHC RBC AUTO-ENTMCNC: 34 G/DL (ref 30–36.5)
MCV RBC AUTO: 89.3 FL (ref 80–99)
MONOCYTES # BLD: 1 K/UL (ref 0–1)
MONOCYTES NFR BLD: 11 % (ref 5–13)
NEUTS SEG # BLD: 5.3 K/UL (ref 1.8–8)
NEUTS SEG NFR BLD: 58 % (ref 32–75)
NITRITE UR QL STRIP.AUTO: NEGATIVE
NRBC # BLD: 0 K/UL (ref 0–0.01)
NRBC BLD-RTO: 0 PER 100 WBC
PH UR STRIP: 6.5 [PH] (ref 5–8)
PLATELET # BLD AUTO: 220 K/UL (ref 150–400)
PMV BLD AUTO: 11.6 FL (ref 8.9–12.9)
POTASSIUM SERPL-SCNC: 4 MMOL/L (ref 3.5–5.1)
PROT SERPL-MCNC: 6.3 G/DL (ref 6.4–8.2)
PROT UR STRIP-MCNC: NEGATIVE MG/DL
PROTHROMBIN TIME: 9.9 SEC (ref 9–11.1)
RBC # BLD AUTO: 4.84 M/UL (ref 4.1–5.7)
SODIUM SERPL-SCNC: 139 MMOL/L (ref 136–145)
SP GR UR REFRACTOMETRY: 1.01 (ref 1–1.03)
URATE SERPL-MCNC: 9.2 MG/DL (ref 3.5–7.2)
UROBILINOGEN UR QL STRIP.AUTO: 0.2 EU/DL (ref 0.2–1)
WBC # BLD AUTO: 9.2 K/UL (ref 4.1–11.1)

## 2021-07-26 PROCEDURE — 85610 PROTHROMBIN TIME: CPT

## 2021-07-26 PROCEDURE — 84300 ASSAY OF URINE SODIUM: CPT

## 2021-07-26 PROCEDURE — 84550 ASSAY OF BLOOD/URIC ACID: CPT

## 2021-07-26 PROCEDURE — 74011250636 HC RX REV CODE- 250/636: Performed by: FAMILY MEDICINE

## 2021-07-26 PROCEDURE — 74011250637 HC RX REV CODE- 250/637: Performed by: SURGERY

## 2021-07-26 PROCEDURE — 65270000029 HC RM PRIVATE

## 2021-07-26 PROCEDURE — G0378 HOSPITAL OBSERVATION PER HR: HCPCS

## 2021-07-26 PROCEDURE — 81003 URINALYSIS AUTO W/O SCOPE: CPT

## 2021-07-26 PROCEDURE — 85025 COMPLETE CBC W/AUTO DIFF WBC: CPT

## 2021-07-26 PROCEDURE — 97161 PT EVAL LOW COMPLEX 20 MIN: CPT

## 2021-07-26 PROCEDURE — 82570 ASSAY OF URINE CREATININE: CPT

## 2021-07-26 PROCEDURE — 36415 COLL VENOUS BLD VENIPUNCTURE: CPT

## 2021-07-26 PROCEDURE — 99218 HC RM OBSERVATION: CPT

## 2021-07-26 PROCEDURE — 80053 COMPREHEN METABOLIC PANEL: CPT

## 2021-07-26 RX ORDER — TRAMADOL HYDROCHLORIDE 50 MG/1
50 TABLET ORAL
Qty: 12 TABLET | Refills: 0 | Status: SHIPPED | OUTPATIENT
Start: 2021-07-26 | End: 2021-07-27

## 2021-07-26 RX ORDER — SODIUM CHLORIDE 9 MG/ML
75 INJECTION, SOLUTION INTRAVENOUS CONTINUOUS
Status: DISCONTINUED | OUTPATIENT
Start: 2021-07-26 | End: 2021-07-27 | Stop reason: HOSPADM

## 2021-07-26 RX ADMIN — OXYCODONE HYDROCHLORIDE AND ACETAMINOPHEN 1 TABLET: 5; 325 TABLET ORAL at 15:09

## 2021-07-26 RX ADMIN — OXYCODONE HYDROCHLORIDE AND ACETAMINOPHEN 1 TABLET: 5; 325 TABLET ORAL at 08:23

## 2021-07-26 RX ADMIN — OXYCODONE HYDROCHLORIDE AND ACETAMINOPHEN 1 TABLET: 5; 325 TABLET ORAL at 20:34

## 2021-07-26 RX ADMIN — SODIUM CHLORIDE 75 ML/HR: 9 INJECTION, SOLUTION INTRAVENOUS at 17:04

## 2021-07-26 NOTE — PROGRESS NOTES
Bedside and Verbal shift change report given to GOPAL Torres (oncoming nurse) by Pablo Hernandez (offgoing nurse). Report included the following information SBAR, Kardex, Intake/Output and MAR.

## 2021-07-26 NOTE — PROGRESS NOTES
CHONG Plan for discharge home when medically stable. Patient stays with a friend. Patient has new PCP appointment scheduled with Dr. Isamar Kang on 8/16/21 at 10:40AM.  CM requested lyft transport to [facility], [address] [place] [address] for 2 PM.    Update 2:23 PM: CM spoke with MD, discharge cancelled for today. CM cancelled Lyft transport. Patient would like to be set up with a new PCP. CM notified CM specialist that patient needs new patient appointment at [facility]. CM requested lyft transport with [employer].     KENYA MullerW/CRM

## 2021-07-26 NOTE — DISCHARGE SUMMARY
Discharge Summary       PATIENT ID: Mesis Harrell  MRN: [id number]   YOB: 1985    DATE OF ADMISSION: 7/24/2021 11:04 AM    DATE OF DISCHARGE: 07/26/21   PRIMARY CARE PROVIDER: None     ATTENDING PHYSICIAN: Silvia Taylor  DISCHARGING PROVIDER: Leonardo Agustin MD    To contact this individual call [phone] and ask the  to page. If unavailable ask to be transferred the Adult Hospitalist Department. CONSULTATIONS: None    PROCEDURES/SURGERIES: Procedure(s):  CLOSED REDUCTION PERCUTANEOUS PINNING RIGHT LUNATE    ADMITTING [address] [place] [address] COURSE:     Dori Garcia a 38 y. o. male with history of obesity surgical stabilization of right breast after [address] injury.  Patient is postop day 0 status post closed reduction percutaneous pinning of right lunate.  He seen and examined at bedside.  Has no acute complaints or concerns. Endorses some numbness in his right hand following surgery but denies any pain.     Patient denies any complaints or problems today, he reports his pain is much better controlled today    DISCHARGE DIAGNOSES / PLAN:      Right wrist fracture  -Postop day 1 status post closed reduction percutaneous pinning of right lunate   Tramadol prn  -CM consult for dispo planning  -Appreciate Ortho input, no further surgical intervention, follow-up outpatient, cleared for discharge      Hypertension  -Blood pressure better controlled, somewhat labile  -Elevated blood pressure likely driven by pain, when I was in the room patient's blood pressure was 126/66  Patient requested to follow-up outpatient with [facility] and recheck blood pressure        Mood disorder   -stable.  Not in any mood stabilizers.     Obesity  -Weight loss counselling                PENDING TEST RESULTS:   At the time of discharge the following test results are still pending: none     FOLLOW UP APPOINTMENTS:    Follow-up Information     Follow up With Specialties Details Why Contact Info    your Primary Care Physician  Schedule an appointment as soon as possible for a visit       [facility] EMERGENCY DEP Emergency Medicine  As needed Tryolanda Anderson [address]    Elizabeth Arnett MD Hand Surgery, General Surgery Schedule an appointment as soon as possible for a visit   [address]  [address] [address]  [phone]      [facility]  In 3 days  [address]  [place] [address] [facility]    Elizabeth Arnett MD Hand Surgery, General Surgery In 1 week  [address]  [address] [address]  [phone]             ADDITIONAL CARE RECOMMENDATIONS:    DIET: Cardiac Diet      ACTIVITY: Activity as tolerated    WOUND CARE: None    EQUIPMENT needed: None      DISCHARGE MEDICATIONS:  Current Discharge Medication List      START taking these medications    Details   traMADoL (ULTRAM) 50 mg tablet Take 1 Tablet by mouth every six (6) hours as needed for Pain for up to 3 days. Max Daily Amount: 200 mg. Qty: 12 Tablet, Refills: 0  Start date: 7/26/2021, End date: 7/29/2021    Associated Diagnoses: Closed fracture of wrist with routine healing, unspecified laterality, subsequent encounter               NOTIFY YOUR PHYSICIAN FOR ANY OF THE FOLLOWING:   Fever over 101 degrees for 24 hours. Chest pain, shortness of breath, fever, chills, nausea, vomiting, diarrhea, change in mentation, falling, weakness, bleeding. Severe pain or pain not relieved by medications. Or, any other signs or symptoms that you may have questions about.     DISPOSITION:    Home With:   OT  PT  HH  RN       Long term SNF/Inpatient Rehab    Independent/assisted living    Hospice   x Other:independent        PATIENT CONDITION AT DISCHARGE:     Functional status    Poor     Deconditioned    x Independent      Cognition     Lucid     Forgetful     Dementia      Catheters/lines (plus indication)    Cross     PICC     PEG    x None      Code status   x  Full code     DNR      PHYSICAL EXAMINATION AT DISCHARGE:  General : alert x 3, awake, no acute distress, resting in bed, pleasant male, appears to be stated age  [de-identified]: PEERL, EOMI, moist mucus membrane, TM clear  Neck: supple, no JVD, no meningeal signs  Chest: Clear to auscultation bilaterally   CVS: S1 S2 heard, Capillary refill less than 2 seconds  Abd: soft/ Non tender, non distended, BS physiological,   Ext: no clubbing, no cyanosis, no edema, right arm in cast  Neuro/Psych: pleasant mood and affect, CN 2-12 grossly intact, sensory grossly within normal limit, Strength 5/5 in all extremities, DTR 1+ x 4  Skin: warm       CHRONIC MEDICAL DIAGNOSES:  Problem List as of 7/26/2021 Never Reviewed        Codes Class Noted - Resolved    Fx wrist ICD-10-CM: Y91.640N  ICD-9-CM: 814.00  7/24/2021 - Present              Greater than 48 minutes were spent with the patient on counseling and coordination of care    Signed:   Breanne Ortega MD  7/26/2021  10:52 AM

## 2021-07-26 NOTE — PROGRESS NOTES
Bedside and Verbal shift change report given to Jacque (oncoming nurse) by Kellee Ruiz (offgoing nurse). Report included the following information SBAR.

## 2021-07-26 NOTE — PROGRESS NOTES
PHYSICAL THERAPY EVALUATION/DISCHARGE  Patient: Tricia Bustos (13 y.o. male)  Date: 7/26/2021  Primary Diagnosis: Fx wrist [S62.109A]  Procedure(s) (LRB):  CLOSED REDUCTION PERCUTANEOUS PINNING RIGHT LUNATE (Right) 2 Days Post-Op   Precautions: fall  Fall      ASSESSMENT  Based on the objective data described below, the patient presents with 2 days post op from closed reduction percutaneous pinning right lunate with pain right wrist 4/10, volar splint in place with ace wrap, reviewed instructions of ice, elevation and rest right hand. Patient is safe and independent with mobility including stairs. Patient at baseline function except right hand/wrist.    Functional Outcome Measure: The patient scored 28/28 on the Tinetti Balance outcome measure which is indicative of low fall risk. .      Other factors to consider for discharge: none     Further skilled acute physical therapy is not indicated at this time. PLAN :  Recommendation for discharge: (in order for the patient to meet his/her long term goals)  No skilled physical therapy/ follow up rehabilitation needs identified at this time. Pt to f/u with hand surgeon in 2 wks. This discharge recommendation:  Has been made in collaboration with the attending provider and/or case management    IF patient discharges home will need the following DME: none       SUBJECTIVE:   Patient stated I was running for my life - I think I fell a couple of times.     OBJECTIVE DATA SUMMARY:   HISTORY:    History reviewed. No pertinent past medical history. History reviewed. No pertinent surgical history.     Prior level of function: Independent - worked 2 jobs  Personal factors and/or comorbidities impacting plan of care: none    [address]: Private residence  # Steps to Enter: 2  Rails [employer]: No  One/Two Story Residence: One story  Living Alone: No  Support Systems: Friends \ neighbors  Patient Expects to be Discharged toVF Cor[de-identified]ration  Current DME Used/Available at Home: None    EXAMINATION/PRESENTATION/DECISION MAKING:   Critical Behavior:  Neurologic State: Alert  Orientation Level: Oriented X4  Cognition: Appropriate decision making, Appropriate safety awareness  Safety/Judgement: Awareness of environment, Good awareness of safety precautions    Range Of Motion:  AROM: Within functional limits (except right hand/wrist/fingers)                       Strength:    Strength: Within functional limits                    Tone & Sensation:   Tone: Normal              Sensation: Intact               Coordination:  Coordination: Within functional limits  Functional Mobility:  Bed Mobility:     Supine to Sit: Modified independent (without use of right hand)  Sit to Supine: Modified independent  Scooting: Modified independent  Transfers:  Sit to Stand: Independent  Stand to Sit: Independent                       Balance:   Sitting: Intact; Without support  Standing: Intact; Without support  Ambulation/Gait Training:  Distance (ft): 100 Feet (ft)     Ambulation - Level of Assistance: Independent     Gait Description (WDL): Within defined limits                                 Stairs:  Number of Stairs Trained: 8  Stairs - Level of Assistance: Independent   Rail Use: None             Physical Therapy Evaluation Charge Determination   History Examination Presentation Decision-Making   LOW Complexity : Zero comorbidities / personal factors that will impact the outcome / POC LOW Complexity : 1-2 Standardized tests and measures addressing body structure, function, activity limitation and / or participation in recreation  LOW Complexity : Stable, uncomplicated  LOW Complexity : FOTO score of       Based on the above components, the patient evaluation is determined to be of the following complexity level: LOW     Pain Rating:  Right wrist/hand 4/10    Activity Tolerance:   Good      After treatment patient left in no apparent distress:   Sitting in chair and Call bell within reach    COMMUNICATION/EDUCATION:   The patients plan of care was discussed with: Registered nurse. Fall prevention education was provided and the patient/caregiver indicated understanding.     Thank you for this referral.  Mauro Kocher, PT   Time Calculation: 15 mins

## 2021-07-26 NOTE — PROGRESS NOTES
Progress Note    Patient: Rula Sánchez MRN: [id number]  SSN: [id number]    YOB: 1985  Age: 38 y.o. Sex: male      Admit Date: 2021    1 Day Post-Op    Procedure:  Procedure(s):  CLOSED REDUCTION PERCUTANEOUS PINNING RIGHT LUNATE    Subjective:     Patient has no new complaints. Pt notes pain is controlled with percocet.     Objective:     Visit Vitals  BP (!) 152/87 (BP 1 Location: Left upper arm, BP Patient Position: At rest)   Pulse 74   Temp 98.5 °F (36.9 °C)   Resp 17   Ht 5' 9\" (1.753 m)   Wt 100.7 kg (222 lb)   SpO2 98%   BMI 32.78 kg/m²       Temp (24hrs), Av.8 °F (36.6 °C), Min:97.1 °F (36.2 °C), Max:98.5 °F (36.9 °C)      Physical Exam:    GENERAL: alert, cooperative, no distress, appears stated age, EXTREMITIES:  Left extremity in splint, able to flex and extend all digits, all digits NVI, dressing C/D/I    Data Review: images and reports reviewed    Lab Review:   CBC: No results found for: WBC, HGB, HGBEXT, HCT, HCTEXT, PLT, PLTEXT, HGBEXT, HCTEXT, PLTEXT    Assessment:     Hospital Problems  Never Reviewed        Codes Class Noted POA    Fx wrist ICD-10-CM: H09.722T  ICD-9-CM: 814.00  2021 Unknown              Plan/Recommendations/Medical Decision Making:     Continue present treatment   D/C planning  Pain meds   F/u in may office as an outpatient in 2 weeks  Will f/u PRN in hospital

## 2021-07-26 NOTE — CONSULTS
NEPHROLOGY CONSULT NOTE     Patient: Ericka Casanova MRN: [id number]  PCP: None   :     1985  Age:   38 y.o. Sex:  male      Referring physician: Chon Lockhart MD  Reason for consultation: 38 y.o. male with Fx wrist [Y21.855I] complicated by BRADFORD   Admission Date: 2021 11:04 AM  LOS: 0 days      ASSESSMENT and PLAN :   BRADFORD:  - unclear eitology: volume depletion vs obstruction vs ATN  - agree with IVF  - bladder scan and SC if residual > 250cc  - renal U/S ordered for AM  - check UA, urine lytes  - repeat labs in AM    R wrist fracture s/p repair    HTN:  - BP stable now       Active Problems / Assessment AAActive  : Active Problems: Fx wrist (2021)         Subjective:   HPI: Ericka Casanova is a 38 y.o.  male who has been admitted to the hospital for R wrist pain. Imaging showing fracture. Underwent closed reduction and percutaneous pinning on . Labs showed a Cr of 1.6, baseline normal in 2021. BP was elevated. Denies any issues voiding. No home medications. Denies NSAID use, stones or UTIs. He denies cp, sob, n/v/d prior to admission. Past Medical Hx:   History reviewed. No pertinent past medical history. Past Surgical Hx:   History reviewed. No pertinent surgical history. Medications:  Prior to Admission medications    Medication Sig Start Date End Date Taking? Authorizing Provider   traMADoL (ULTRAM) 50 mg tablet Take 1 Tablet by mouth every six (6) hours as needed for Pain for up to 3 days. Max Daily Amount: 200 mg. 21 Yes Chon Lockhart MD       No Known Allergies    Social Hx:  reports that he has been smoking. He does not have any smokeless tobacco history on file. He reports current drug use. Drug: Cocaine. History reviewed. No pertinent family history. Review of Systems:  A twelve point review of system was performed today. Pertinent positives and negatives are mentioned in the HPI.  The reminder of the ROS is negative and noncontributory. Objective:    Vitals:    Vitals:    07/25/21 2036 07/26/21 0329 07/26/21 0821 07/26/21 1403   BP: (!) 150/79 (!) 144/81 139/85 (!) 154/91   Pulse: 60 65 (!) 54 81   Resp: 18 18 16 16   Temp: 98.8 °F (37.1 °C) 98.4 °F (36.9 °C) 98.3 °F (36.8 °C) 98.5 °F (36.9 °C)   SpO2: 97% 95% 99% 98%   Weight:       Height:         I&O's:  07/25 0701 - 07/26 0700  In: -   Out: 800 [Urine:800]  Visit Vitals  BP (!) 154/91 (BP 1 Location: Left upper arm, BP Patient Position: Sitting)   Pulse 81   Temp 98.5 °F (36.9 °C)   Resp 16   Ht 5' 9\" (1.753 m)   Wt 100.7 kg (222 lb)   SpO2 98%   BMI 32.78 kg/m²       Physical Exam:  General:Alert, No distress,   HEENT: Eyes are PERRL. Conjunctiva without pallor ,erythema. The sclerae without icterus. .   Neck:Supple,no mass palpable  Lungs : Clears to auscultation Bilaterally, Normal respiratory effort  CVS: RRR, S1 S2 normal, No rub,  no LE edema  Abdomen: Soft, Non tender, No hepatosplenomegaly, bowel sounds present  Extremities: No cyanosis, No clubbing  Skin: No rash or lesions. Lymph nodes: No palpable nodes  MS: No joint swelling, erythema, warmth  Neurologic: non focal, AAO x 3  Psych: normal affect    Laboratory Results:    Lab Results   Component Value Date    BUN 18 07/26/2021     07/26/2021    K 4.0 07/26/2021     07/26/2021    CO2 27 07/26/2021       Lab Results   Component Value Date    BUN 18 07/26/2021    K 4.0 07/26/2021       Lab Results   Component Value Date    WBC 9.2 07/26/2021    RBC 4.84 07/26/2021    HGB 14.7 07/26/2021    HCT 43.2 07/26/2021    MCV 89.3 07/26/2021    MCH 30.4 07/26/2021    RDW 14.2 07/26/2021     07/26/2021       No results found for: PTH, PHOS    Urine dipstick:   No results found for: COLOR, APPRN, SPGRU, REFSG, LILY, PROTU, GLUCU, KETU, BILU, UROU, CECILLE, LEUKU, GLUKE, EPSU, BACTU, WBCU, RBCU, CASTS, UCRY              Thank you for allowing us to participate in the care of this patient.    We will follow patient. Please dont hesitate to call with any questions    Sneha Mckeon MD  7/26/2021    [facility] [address] Brenda Ruiz [address] [place] [address]  Phone - [phone]   Fax - [phone]  [email or web address]

## 2021-07-27 ENCOUNTER — APPOINTMENT (OUTPATIENT)
Dept: ULTRASOUND IMAGING | Age: 36
End: 2021-07-27
Attending: INTERNAL MEDICINE
Payer: MEDICAID

## 2021-07-27 VITALS
HEIGHT: 69 IN | RESPIRATION RATE: 18 BRPM | WEIGHT: 222 LBS | SYSTOLIC BLOOD PRESSURE: 133 MMHG | TEMPERATURE: 98.4 F | DIASTOLIC BLOOD PRESSURE: 72 MMHG | BODY MASS INDEX: 32.88 KG/M2 | HEART RATE: 65 BPM | OXYGEN SATURATION: 97 %

## 2021-07-27 LAB
ANION GAP SERPL CALC-SCNC: 5 MMOL/L (ref 5–15)
BASOPHILS # BLD: 0.1 K/UL (ref 0–0.1)
BASOPHILS NFR BLD: 1 % (ref 0–1)
BUN SERPL-MCNC: 15 MG/DL (ref 6–20)
BUN/CREAT SERPL: 11 (ref 12–20)
CALCIUM SERPL-MCNC: 8.4 MG/DL (ref 8.5–10.1)
CHLORIDE SERPL-SCNC: 107 MMOL/L (ref 97–108)
CO2 SERPL-SCNC: 26 MMOL/L (ref 21–32)
CREAT SERPL-MCNC: 1.33 MG/DL (ref 0.7–1.3)
CREAT UR-MCNC: 62.7 MG/DL
DIFFERENTIAL METHOD BLD: NORMAL
EOSINOPHIL # BLD: 0.1 K/UL (ref 0–0.4)
EOSINOPHIL NFR BLD: 1 % (ref 0–7)
ERYTHROCYTE [DISTWIDTH] IN BLOOD BY AUTOMATED COUNT: 14.2 % (ref 11.5–14.5)
GLUCOSE SERPL-MCNC: 108 MG/DL (ref 65–100)
HCT VFR BLD AUTO: 42.2 % (ref 36.6–50.3)
HGB BLD-MCNC: 13.8 G/DL (ref 12.1–17)
IMM GRANULOCYTES # BLD AUTO: 0 K/UL (ref 0–0.04)
IMM GRANULOCYTES NFR BLD AUTO: 0 % (ref 0–0.5)
LYMPHOCYTES # BLD: 3.3 K/UL (ref 0.8–3.5)
LYMPHOCYTES NFR BLD: 43 % (ref 12–49)
MCH RBC QN AUTO: 29.6 PG (ref 26–34)
MCHC RBC AUTO-ENTMCNC: 32.7 G/DL (ref 30–36.5)
MCV RBC AUTO: 90.4 FL (ref 80–99)
MONOCYTES # BLD: 0.7 K/UL (ref 0–1)
MONOCYTES NFR BLD: 9 % (ref 5–13)
NEUTS SEG # BLD: 3.5 K/UL (ref 1.8–8)
NEUTS SEG NFR BLD: 46 % (ref 32–75)
NRBC # BLD: 0 K/UL (ref 0–0.01)
NRBC BLD-RTO: 0 PER 100 WBC
PLATELET # BLD AUTO: 224 K/UL (ref 150–400)
PMV BLD AUTO: 11.5 FL (ref 8.9–12.9)
POTASSIUM SERPL-SCNC: 4.1 MMOL/L (ref 3.5–5.1)
RBC # BLD AUTO: 4.67 M/UL (ref 4.1–5.7)
SODIUM SERPL-SCNC: 138 MMOL/L (ref 136–145)
SODIUM UR-SCNC: 50 MMOL/L
WBC # BLD AUTO: 7.5 K/UL (ref 4.1–11.1)

## 2021-07-27 PROCEDURE — 36415 COLL VENOUS BLD VENIPUNCTURE: CPT

## 2021-07-27 PROCEDURE — 76770 US EXAM ABDO BACK WALL COMP: CPT

## 2021-07-27 PROCEDURE — G0378 HOSPITAL OBSERVATION PER HR: HCPCS

## 2021-07-27 PROCEDURE — 74011250637 HC RX REV CODE- 250/637: Performed by: SURGERY

## 2021-07-27 PROCEDURE — 80048 BASIC METABOLIC PNL TOTAL CA: CPT

## 2021-07-27 PROCEDURE — 74011250636 HC RX REV CODE- 250/636: Performed by: FAMILY MEDICINE

## 2021-07-27 PROCEDURE — 85025 COMPLETE CBC W/AUTO DIFF WBC: CPT

## 2021-07-27 RX ORDER — TRAMADOL HYDROCHLORIDE 50 MG/1
50 TABLET ORAL
Qty: 12 TABLET | Refills: 0 | Status: SHIPPED | OUTPATIENT
Start: 2021-07-27 | End: 2021-07-30

## 2021-07-27 RX ADMIN — OXYCODONE HYDROCHLORIDE AND ACETAMINOPHEN 1 TABLET: 5; 325 TABLET ORAL at 05:22

## 2021-07-27 RX ADMIN — OXYCODONE HYDROCHLORIDE AND ACETAMINOPHEN 1 TABLET: 5; 325 TABLET ORAL at 10:23

## 2021-07-27 RX ADMIN — SODIUM CHLORIDE 75 ML/HR: 9 INJECTION, SOLUTION INTRAVENOUS at 08:48

## 2021-07-27 NOTE — PROGRESS NOTES
Bedside and Verbal shift change report given to [address] [place] [address] JOSE M (oncoming nurse) by Lukas Barrett (offgoing nurse). Report included the following information SBAR, Kardex, Procedure Summary, Intake/Output and MAR.

## 2021-07-27 NOTE — DISCHARGE SUMMARY
Discharge Summary       PATIENT ID: Manjinder Escamilla  MRN: [id number]   YOB: 1985    DATE OF ADMISSION: 7/24/2021 11:04 AM    DATE OF DISCHARGE: 07/26/21   PRIMARY CARE PROVIDER: None     ATTENDING PHYSICIAN: Iam Alfredo  DISCHARGING PROVIDER: Elaine Sinclair MD    To contact this individual call [phone] and ask the  to page. If unavailable ask to be transferred the Adult Hospitalist Department. CONSULTATIONS: IP CONSULT TO NEPHROLOGY    PROCEDURES/SURGERIES: Procedure(s):  CLOSED REDUCTION PERCUTANEOUS PINNING RIGHT LUNATE    ADMITTING [address] [place] [address] COURSE:     Molly Huddleston a 38 y. o. male with history of obesity surgical stabilization of right breast after [address] injury.  Patient is postop day 0 status post closed reduction percutaneous pinning of right lunate.  He seen and examined at bedside.  Has no acute complaints or concerns. Endorses some numbness in his right hand following surgery but denies any pain.     Patient denies any complaints or problems today, he reports his pain is much better controlled today    DISCHARGE DIAGNOSES / PLAN:      Right wrist fracture  -Postop day 1 status post closed reduction percutaneous pinning of right lunate   Tramadol prn  -CM consult for dispo planning  -Appreciate Ortho input, no further surgical intervention, follow-up outpatient, cleared for discharge   BRADFORD  Unclear etiology, creatinine trending down  Spoke with Dr Sera Selby, no acute intervention for now, patient cleared for discharge per nephrology, Renal US wnl,   Repeat BMP with nephrology     Hypertension  -Blood pressure stable   -Elevated blood pressure likely driven by pain,   Patient requested to follow-up outpatient with crossover clinic and recheck blood pressure        Mood disorder   -stable.  Not in any mood stabilizers.     Obesity  -Weight loss counselling                PENDING TEST RESULTS:   At the time of discharge the following test results are still pending: none     FOLLOW UP APPOINTMENTS:    Follow-up Information     Follow up With Specialties Details Why Contact Info    your Primary Care Physician  Schedule an appointment as soon as possible for a visit       [facility] EMERGENCY DEP Emergency Medicine  As needed Calin Anderson [address]    Taj Cuadra MD Hand Surgery, General Surgery   [address]  [address] [address]  [facility]  In 2 days  [address] Valerie Vee MD Hand Surgery, General Surgery In 1 week  [address]  [address]  [place] [address]  [phone]      Og Hanna MD Family Medicine On 8/16/2021 For new patient appointment at 10:40AM  [address]  [address] [place] [address]  [phone]      None    None (395) Patient stated that they have no PCP      Elizabeth Her MD Nephrology In 2 days  [facility]  [phone]             ADDITIONAL CARE RECOMMENDATIONS:    DIET: Cardiac Diet      ACTIVITY: Activity as tolerated    WOUND CARE: None    EQUIPMENT needed: None      DISCHARGE MEDICATIONS:  Current Discharge Medication List      START taking these medications    Details   traMADoL (ULTRAM) 50 mg tablet Take 1 Tablet by mouth every six (6) hours as needed for Pain for up to 3 days. Max Daily Amount: 200 mg. Qty: 12 Tablet, Refills: 0  Start date: 7/26/2021, End date: 7/29/2021    Associated Diagnoses: Closed fracture of wrist with routine healing, unspecified laterality, subsequent encounter               NOTIFY YOUR PHYSICIAN FOR ANY OF THE FOLLOWING:   Fever over 101 degrees for 24 hours. Chest pain, shortness of breath, fever, chills, nausea, vomiting, diarrhea, change in mentation, falling, weakness, bleeding. Severe pain or pain not relieved by medications. Or, any other signs or symptoms that you may have questions about.     DISPOSITION:    Home With:   OT  PT HH  RN       Long term SNF/Inpatient Rehab    Independent/assisted living    Hospice   x Other:independent        PATIENT CONDITION AT DISCHARGE:     Functional status    Poor     Deconditioned    x Independent      Cognition     Lucid     Forgetful     Dementia      Catheters/lines (plus indication)    Cross     PICC     PEG    x None      Code status   x  Full code     DNR      PHYSICAL EXAMINATION AT DISCHARGE:  General : alert x 3, awake, no acute distress, resting in bed, pleasant male, appears to be stated age  HEENT: PEERL, EOMI, moist mucus membrane, TM clear  Neck: supple, no JVD, no meningeal signs  Chest: Clear to auscultation bilaterally   CVS: S1 S2 heard, Capillary refill less than 2 seconds  Abd: soft/ Non tender, non distended, BS physiological,   Ext: no clubbing, no cyanosis, no edema, right arm in cast  Neuro/Psych: pleasant mood and affect, CN 2-12 grossly intact, sensory grossly within normal limit, Strength 5/5 in all extremities, DTR 1+ x 4  Skin: warm       CHRONIC MEDICAL DIAGNOSES:  Problem List as of 7/27/2021 Never Reviewed        Codes Class Noted - Resolved    BRADFORD (acute kidney injury) ([facility] [address] ICD-10-CM: N17.9  ICD-9-CM: 584.9  7/26/2021 - Present        Fx wrist ICD-10-CM: C83.056E  ICD-9-CM: 814.00  7/24/2021 - Present              Greater than 48 minutes were spent with the patient on counseling and coordination of care    Signed:   Maynor Guy MD  7/27/2021  10:52 AM

## 2021-07-27 NOTE — DISCHARGE INSTRUCTIONS
Please follow-up with crossover clinic, Nephrology and hand surgery as directed  Please return to ED or seek immediate medical attention if any swelling in the operated arm, any increased pain, discomfort, any headache, blurry vision, sore throat, trouble swallowing, trouble with speech, any numbness or tingling in the arms or legs, any lightheadedness, dizziness, falls, injuries, chest pain, shortness of breath, cough, fevers, chills, nausea, vomiting, diarrhea, trouble moving arms or legs, rashes, urinary complaints, or any other concerns or problems

## 2021-07-27 NOTE — PROGRESS NOTES
Nephrology Progress Note  Gaby Smith  Date of Admission : 7/24/2021    CC: Follow up for BRADFORD       Assessment and Plan     BRADFORD:  - unclear eitology: volume depletion vs obstruction vs ATN  - agree with IVF  - U/S neg  - ok for d/c  - can f/u with repeat labs in 2 weeks    R wrist fracture s/p repair     HTN:  - BP stable now       Interval History:  Seen and examined. Feeling better. Cr better. No cp, sob, n/v/d reported. Current Medications: all current  Medications have been eviewed in EPIC  Review of Systems: Pertinent items are noted in HPI. Objective:  Vitals:    Vitals:    07/26/21 1403 07/26/21 2038 07/27/21 0138 07/27/21 0800   BP: (!) 154/91 (!) 159/62 133/72    Pulse: 81 (!) 59 64 65   Resp: 16 14 18    Temp: 98.5 °F (36.9 °C) 98.5 °F (36.9 °C) 98.4 °F (36.9 °C)    SpO2: 98% 98% 97%    Weight:       Height:         Intake and Output:  No intake/output data recorded. 07/25 1901 - 07/27 0700  In: -   Out: 800 [Urine:800]    Physical Examination:    General: NAD,Conversant   Neck:  Supple, no mass  Resp:  Lungs CTA B/L, no wheezing , normal respiratory effort  CV:  RRR,  no murmur or rub, no LE edema  GI:  Soft, NT, + Bowel sounds, no hepatosplenomegaly  Neurologic:  Non focal  Psych:             AAO x 3 appropriate affect   Skin:  No Rash  :  No pat    []    High complexity decision making was performed  []    Patient is at high-risk of decompensation with multiple organ involvement    Lab Data Personally Reviewed: I have reviewed all the pertinent labs, microbiology data and radiology studies during assessment.     Recent Labs     07/27/21 0147 07/26/21  1009    139   K 4.1 4.0    108   CO2 26 27   * 90   BUN 15 18   CREA 1.33* 1.62*   CA 8.4* 8.4*   ALB  --  3.0*   ALT  --  30   INR  --  0.9     Recent Labs     07/27/21  0147 07/26/21  1009   WBC 7.5 9.2   HGB 13.8 14.7   HCT 42.2 43.2    220     No results found for: SDES  No results found for: CULT  Recent Results (from the past 24 hour(s))   URINALYSIS W/ RFLX MICROSCOPIC    Collection Time: 07/26/21  8:36 PM   Result Value Ref Range    Color YELLOW/STRAW      Appearance CLEAR CLEAR      Specific gravity 1.009 1.003 - 1.030      pH (UA) 6.5 5.0 - 8.0      Protein Negative NEG mg/dL    Glucose Negative NEG mg/dL    Ketone Negative NEG mg/dL    Bilirubin Negative NEG      Blood Negative NEG      Urobilinogen 0.2 0.2 - 1.0 EU/dL    Nitrites Negative NEG      Leukocyte Esterase Negative NEG     SODIUM, UR, RANDOM    Collection Time: 07/26/21  8:36 PM   Result Value Ref Range    Sodium,urine random 50 MMOL/L   CREATININE, UR, RANDOM    Collection Time: 07/26/21  8:36 PM   Result Value Ref Range    Creatinine, urine 00.59 mg/dL   METABOLIC PANEL, BASIC    Collection Time: 07/27/21  1:47 AM   Result Value Ref Range    Sodium 138 136 - 145 mmol/L    Potassium 4.1 3.5 - 5.1 mmol/L    Chloride 107 97 - 108 mmol/L    CO2 26 21 - 32 mmol/L    Anion gap 5 5 - 15 mmol/L    Glucose 108 (H) 65 - 100 mg/dL    BUN 15 6 - 20 MG/DL    Creatinine 1.33 (H) 0.70 - 1.30 MG/DL    BUN/Creatinine ratio 11 (L) 12 - 20      GFR est AA >60 >60 ml/min/1.73m2    GFR est non-AA >60 >60 ml/min/1.73m2    Calcium 8.4 (L) 8.5 - 10.1 MG/DL   CBC WITH AUTOMATED DIFF    Collection Time: 07/27/21  1:47 AM   Result Value Ref Range    WBC 7.5 4.1 - 11.1 K/uL    RBC 4.67 4.10 - 5.70 M/uL    HGB 13.8 12.1 - 17.0 g/dL    HCT 42.2 36.6 - 50.3 %    MCV 90.4 80.0 - 99.0 FL    MCH 29.6 26.0 - 34.0 PG    MCHC 32.7 30.0 - 36.5 g/dL    RDW 14.2 11.5 - 14.5 %    PLATELET 784 737 - 971 K/uL    MPV 11.5 8.9 - 12.9 FL    NRBC 0.0 0  WBC    ABSOLUTE NRBC 0.00 0.00 - 0.01 K/uL    NEUTROPHILS 46 32 - 75 %    LYMPHOCYTES 43 12 - 49 %    MONOCYTES 9 5 - 13 %    EOSINOPHILS 1 0 - 7 %    BASOPHILS 1 0 - 1 %    IMMATURE GRANULOCYTES 0 0.0 - 0.5 %    ABS. NEUTROPHILS 3.5 1.8 - 8.0 K/UL    ABS. LYMPHOCYTES 3.3 0.8 - 3.5 K/UL    ABS. MONOCYTES 0.7 0.0 - 1.0 K/UL    ABS. EOSINOPHILS 0.1 0.0 - 0.4 K/UL    ABS. BASOPHILS 0.1 0.0 - 0.1 K/UL    ABS. IMM. GRANS. 0.0 0.00 - 0.04 K/UL    DF AUTOMATED               Shmuel Rosales MD  [facility]   [address]  Phone - [phone]   Fax - [phone]  [email or web address]

## 2021-07-27 NOTE — PROGRESS NOTES
Bedside and Verbal shift change report given to Vickey Selby RN (oncoming nurse) by Joel Gonzalez (offgoing nurse). Report included the following information SBAR and Kardex.

## 2021-07-30 NOTE — PROGRESS NOTES
Physician Progress Note      Vanessa Guillory  [facility] #:                  [id number]  :                       1985  ADMIT DATE:       2021 11:04 AM  DISCH DATE:        2021 2:15 PM  RESPONDING  PROVIDER #:        Tristian Christensen MD          QUERY TEXT:    Pt admitted with Right wrist fracture. Pt noted to have BRDAFORD. If possible, please document in progress notes and discharge summary the present on admission status of BRADFORD :      The medical record reflects the following:  Risk Factors: 34yo who reported he was using cocaine the night before his admission, s/p closed reduction with pinning of R wrist    Clinical Indicators:   Nephro consult: BRADFORD:  - unclear eitology: volume depletion vs obstruction vs ATN  - agree with IVF  - bladder scan and SC if residual > 250cc  - renal U/S ordered for AM  - check UA, urine lytes  - repeat labs in AM    DS: BRADFORD  Unclear etiology, creatinine trending down  Spoke with Dr Thomasine Nyhan, no acute intervention for now, patient cleared for discharge per nephrology, Renal US wnl    Creatinine: 1.62 -- 1.33    Treatment: Nephrology following, IVF, renal U/S, UA, check urine lytes, renal labs    Thank you,  Tez Wallace  [phone]  Options provided:  -- No, BRADFORD was not present on admission and developed during the inpatient stay  -- Yes, BRADFORD was present at the time of the order to admit to the hospital  -- Other - I will add my own diagnosis  -- Disagree - Not applicable / Not valid  -- Disagree - Clinically unable to determine / Unknown  -- Refer to Clinical Documentation Reviewer    PROVIDER RESPONSE TEXT:    Provider is clinically unable to determine a response to this query.     Query created by: Mathew Garcia on 2021 2:17 PM      Electronically signed by:  Tristian Christensen MD 2021 11:15 AM

## (undated) DEVICE — Device: Brand: MICROAIRE®

## (undated) DEVICE — DISPOSABLE TOURNIQUET CUFF SINGLE BLADDER, DUAL PORT AND QUICK CONNECT CONNECTOR: Brand: COLOR CUFF

## (undated) DEVICE — REM POLYHESIVE ADULT PATIENT RETURN ELECTRODE: Brand: VALLEYLAB

## (undated) DEVICE — DRAPE,EXTREMITY,89X128,STERILE: Brand: MEDLINE

## (undated) DEVICE — PENCIL ES L3M BTTN SWCH S STL HEX LOK BLDE ELECTRD HOLSTER

## (undated) DEVICE — BANDAGE,ELASTIC,ESMARK,STERILE,4"X9',LF: Brand: MEDLINE

## (undated) DEVICE — DRSG GZ OIL EMUL CURAD 3X3 --

## (undated) DEVICE — GOWN,SIRUS,NONRNF,SETINSLV,2XL,18/CS: Brand: MEDLINE

## (undated) DEVICE — INTENDED FOR TISSUE SEPARATION, AND OTHER PROCEDURES THAT REQUIRE A SHARP SURGICAL BLADE TO PUNCTURE OR CUT.: Brand: BARD-PARKER ® CARBON RIB-BACK BLADES

## (undated) DEVICE — GLOVE ORTHO 8   MSG9480

## (undated) DEVICE — BASIN ST MAJOR-NO CAUTERY: Brand: MEDLINE INDUSTRIES, INC.

## (undated) DEVICE — PACK,BASIC,SIRUS,V: Brand: MEDLINE

## (undated) DEVICE — COVER LT HNDL PLAS RIG 1 PER PK

## (undated) DEVICE — SUTURE MCRYL SZ 4 0 L18IN ABSRB UD PC 5 L19MM 3 8 CIR SGL Y823G

## (undated) DEVICE — PREP SKN CHLRAPRP APL 26ML STR --

## (undated) DEVICE — C-ARM: Brand: UNBRANDED

## (undated) DEVICE — SOLUTION IRRIG 1000ML 0.9% SOD CHL USP POUR PLAS BTL